# Patient Record
Sex: MALE | Race: WHITE | NOT HISPANIC OR LATINO | Employment: FULL TIME | URBAN - METROPOLITAN AREA
[De-identification: names, ages, dates, MRNs, and addresses within clinical notes are randomized per-mention and may not be internally consistent; named-entity substitution may affect disease eponyms.]

---

## 2017-02-03 ENCOUNTER — OFFICE VISIT (OUTPATIENT)
Dept: CARDIOLOGY | Facility: CLINIC | Age: 62
End: 2017-02-03

## 2017-02-03 VITALS
WEIGHT: 181 LBS | SYSTOLIC BLOOD PRESSURE: 142 MMHG | HEART RATE: 77 BPM | BODY MASS INDEX: 28.35 KG/M2 | DIASTOLIC BLOOD PRESSURE: 87 MMHG

## 2017-02-03 DIAGNOSIS — I25.10 CORONARY ARTERY DISEASE INVOLVING NATIVE CORONARY ARTERY OF NATIVE HEART WITHOUT ANGINA PECTORIS: ICD-10-CM

## 2017-02-03 DIAGNOSIS — E78.2 MIXED HYPERLIPIDEMIA: ICD-10-CM

## 2017-02-03 DIAGNOSIS — I10 ESSENTIAL HYPERTENSION: Primary | ICD-10-CM

## 2017-02-03 PROCEDURE — 93000 ELECTROCARDIOGRAM COMPLETE: CPT | Performed by: INTERNAL MEDICINE

## 2017-02-03 PROCEDURE — 99214 OFFICE O/P EST MOD 30 MIN: CPT | Performed by: INTERNAL MEDICINE

## 2017-02-03 NOTE — PROGRESS NOTES
Procedure   [facility]  Cardiology Progress Note    Patient Identification:  Name:Surinder Salmeron  Age:61 y.o.  Sex: male  :  1955  MRN: [id number]           2/3/2017    Subjective:    Chief Complaint   Patient presents with   • Hypertension       HPI     Mr. Salmeron has history of for lung cancer that was resected in the past  following which it was treated with radiation therapy. Following radiation he had a bout of for pericarditis that subsequently resolved. He did have past echocardiogram done that showed normal LV systolic function. He is physically stable denies any exertional shortness of breath, PND or orthopnea. No chest pains, heaviness or pressure. No PND or ortho apnea or heart palpitations. He gets a sharp or stabbing kind of pain in the chest that is more atypical than typical for angina.     He did have a vascular screening done in the past that showed some mild plaque in the carotid arteries. He is on lipitor and lowfat diet is also advised. ECG today showed sinus rhythm with early repolarization . He was in the hospital with some chest pain and he had a cardiolyte stress test done that has been normal in early ,. No ischemia on the Ecg. Subsequently in 2015 he underwent cardiac cath and has some disease in diagonal branch ostium, not a optimal site for stenting, hence, medical management is recommended.    The LAD has 40% mid-disease. There is a first diagonal with the origin 50%-70% esion, luminal irregularities of the circumflex and RCA. The RCA is dominant    He said that his mother passed away and he is a bit depressed since then.  No angina. His ECG has been stable    He said that he had one episode of pain in the left arm and to the side of chest, stabbing kind of pain and unlikely angina.    Review of Systems   Constitution: Negative for chills, fever and malaise/fatigue.   HENT: Negative for headaches and hearing loss.    Eyes: Negative for blurred  vision and double vision.   Cardiovascular: Positive for chest pain. Negative for leg swelling (compression socks).   Respiratory: Positive for snoring. Negative for shortness of breath.    Skin: Negative for color change.   Musculoskeletal: Positive for arthritis and joint pain.   Gastrointestinal: Negative for abdominal pain, nausea and vomiting.   Neurological: Positive for numbness (3 weeks ago). Negative for dizziness and light-headedness.   Psychiatric/Behavioral: Negative for depression.       Past Medical History   Diagnosis Date   • Hyperlipidemia    • Lung cancer    • Pericarditis        Past Surgical History   Procedure Laterality Date   • Bronchoscopy     • Vasectomy         Social History     Social History   • Marital status:      Spouse name: N/A   • Number of children: N/A   • Years of education: N/A     Occupational History   • Not on file.     Social History Main Topics   • Smoking status: Former Smoker     Packs/day: 4.00     Years: 30.00     Types: Cigarettes     Quit date: 2005   • Smokeless tobacco: Never Used   • Alcohol use No   • Drug use: No   • Sexual activity: Not on file     Other Topics Concern   • Not on file     Social History Narrative       Family History   Problem Relation Age of Onset   • Coronary artery disease Other    • Diabetes Other    • Lung cancer Other        Scheduled Meds:    Current Outpatient Prescriptions:   •  aspirin 81 MG tablet, Take 1 tablet by mouth daily., Disp: , Rfl:   •  atorvastatin (LIPITOR) 80 MG tablet, TAKE 1 TABLET DAILY, Disp: 90 tablet, Rfl: 0  •  Coenzyme Q10 (CO Q-10) 300 MG capsule, Take 1 capsule by mouth daily., Disp: , Rfl:   •  isosorbide mononitrate (IMDUR) 30 MG 24 hr tablet, TAKE 1 TABLET DAILY AS DIRECTED, Disp: 90 tablet, Rfl: 1  •  metoprolol succinate XL (TOPROL-XL) 50 MG 24 hr tablet, Take 1 tablet by mouth daily. One and 1/2 tab qd, Disp: 140 tablet, Rfl: 3  •  Multiple Vitamins tablet, Take 1 tablet by mouth daily., Disp: ,  Rfl:   •  nitroglycerin (NITROSTAT) 0.4 MG SL tablet, Place under the tongue., Disp: , Rfl:   •  Omega-3 Fatty Acids (FISH OIL) 1000 MG capsule capsule, Take  by mouth daily with breakfast., Disp: , Rfl:     Objective:  Visit Vitals   • /87   • Pulse 77   • Wt 181 lb (82.1 kg)   • BMI 28.35 kg/m2        Physical Exam  Physical Exam:    General: No acute distress.    Skin: Warm and dry, no diaphoresis noted   HEENT: No ptosis; external ear and nose normal; oral mucosa moist   Neck: Supple; no carotid bruits; no JVD, Trachea mid line   Heart: S1S2 regular rate and rhythm; soft systolic murmurs; no gallop or rub appreciated, apex not displaced   Chest: Respirations regular, unlabored at rest, bilateral breath sounds have good air entry; no  wheezes auscultated.     Abdomen: Soft, non-tender, non-distended, positive bowel sounds  No hepatosplenomegaly   Extremities: Bilateral lower extremities have no pre-tibial pitting edema; Radials are palpable   Neurological: Alert and oriented x 3; no new motor deficits,           ECG 12 Lead  Date/Time: 2/3/2017 2:27 PM  Performed by: SPENCER ROSENTHAL  Authorized by: SPENCER ROSENTHAL   Comparison: compared with previous ECG   Rhythm: sinus rhythm  Rate: normal  QRS axis: normal             Comparison to previous ECG:  Similar to previous ecg     Assessment:  Problem List Items Addressed This Visit        Cardiovascular and Mediastinum    Essential hypertension - Primary    Overview     Overview:   2015 IMO UPDATE         Hyperlipidemia    Overview     Overview:   pericarditis           Other Visit Diagnoses     Coronary artery disease involving native coronary artery of native heart without angina pectoris              Plan:    Overall he is clinically stable and his chest pain is more atypical than typical.  I will obtain a plain treadmill stress test and see how he does. His ECG no acute ST abnormality.      02/03/2017  Chance Rosenthal MD, FACC

## 2017-02-15 ENCOUNTER — HOSPITAL ENCOUNTER (OUTPATIENT)
Dept: CARDIOLOGY | Facility: HOSPITAL | Age: 62
Discharge: HOME | End: 2017-02-15
Attending: INTERNAL MEDICINE | Admitting: INTERNAL MEDICINE

## 2017-02-15 VITALS — HEART RATE: 90 BPM | DIASTOLIC BLOOD PRESSURE: 82 MMHG | SYSTOLIC BLOOD PRESSURE: 142 MMHG

## 2017-02-15 DIAGNOSIS — E78.2 MIXED HYPERLIPIDEMIA: ICD-10-CM

## 2017-02-15 DIAGNOSIS — I25.10 CORONARY ARTERY DISEASE INVOLVING NATIVE CORONARY ARTERY OF NATIVE HEART WITHOUT ANGINA PECTORIS: ICD-10-CM

## 2017-02-15 DIAGNOSIS — I10 ESSENTIAL HYPERTENSION: ICD-10-CM

## 2017-02-15 PROCEDURE — 93018 CV STRESS TEST I&R ONLY: CPT | Performed by: INTERNAL MEDICINE

## 2017-02-15 PROCEDURE — 93016 CV STRESS TEST SUPVJ ONLY: CPT | Performed by: INTERNAL MEDICINE

## 2017-02-15 PROCEDURE — 93017 CV STRESS TEST TRACING ONLY: CPT

## 2017-02-20 ENCOUNTER — TELEPHONE (OUTPATIENT)
Dept: CARDIOLOGY | Facility: CLINIC | Age: 62
End: 2017-02-20

## 2017-02-20 NOTE — TELEPHONE ENCOUNTER
Left message for Mr. Salmeron to return my call regarding TMT results.   TRISH Lopez RN      TMT stress test normal per [facility].

## 2017-03-10 DIAGNOSIS — E78.5 HYPERLIPEMIA: ICD-10-CM

## 2017-03-13 RX ORDER — ATORVASTATIN CALCIUM 80 MG/1
TABLET, FILM COATED ORAL
Qty: 90 TABLET | Refills: 1 | Status: SHIPPED | OUTPATIENT
Start: 2017-03-13

## 2017-03-25 RX ORDER — ISOSORBIDE MONONITRATE 30 MG/1
TABLET, EXTENDED RELEASE ORAL
Qty: 90 TABLET | Refills: 0 | Status: SHIPPED | OUTPATIENT
Start: 2017-03-25 | End: 2017-06-23 | Stop reason: SDUPTHER

## 2017-04-09 LAB
BH CV STRESS BP STAGE 1: NORMAL
BH CV STRESS BP STAGE 2: NORMAL
BH CV STRESS BP STAGE 3: NORMAL
BH CV STRESS DURATION MIN STAGE 1: 3
BH CV STRESS DURATION MIN STAGE 2: 3
BH CV STRESS DURATION MIN STAGE 3: 0
BH CV STRESS DURATION SEC STAGE 1: 0
BH CV STRESS DURATION SEC STAGE 2: 0
BH CV STRESS DURATION SEC STAGE 3: 30
BH CV STRESS GRADE STAGE 1: 10
BH CV STRESS GRADE STAGE 2: 12
BH CV STRESS GRADE STAGE 3: 14
BH CV STRESS HR STAGE 1: 124
BH CV STRESS HR STAGE 2: 147
BH CV STRESS HR STAGE 3: 153
BH CV STRESS METS STAGE 1: 4.6
BH CV STRESS METS STAGE 2: 7.1
BH CV STRESS METS STAGE 3: 7.6
BH CV STRESS PROTOCOL 1: NORMAL
BH CV STRESS RECOVERY BP: NORMAL MMHG
BH CV STRESS RECOVERY HR: 99 BPM
BH CV STRESS SPEED STAGE 1: 1.7
BH CV STRESS SPEED STAGE 2: 2.5
BH CV STRESS SPEED STAGE 3: 3.4
BH CV STRESS STAGE 1: 1
BH CV STRESS STAGE 2: 2
BH CV STRESS STAGE 3: 3
MAXIMAL PREDICTED HEART RATE: 161 BPM
PERCENT MAX PREDICTED HR: 95.03 %
STRESS BASELINE BP: NORMAL MMHG
STRESS BASELINE HR: 88 BPM
STRESS PERCENT HR: 112 %
STRESS POST ESTIMATED WORKLOAD: 7.7 METS
STRESS POST EXERCISE DUR MIN: 6 MIN
STRESS POST EXERCISE DUR SEC: 30 SEC
STRESS POST PEAK BP: NORMAL MMHG
STRESS POST PEAK HR: 153 BPM
STRESS TARGET HR: 137 BPM

## 2017-06-23 RX ORDER — ISOSORBIDE MONONITRATE 30 MG/1
TABLET, EXTENDED RELEASE ORAL
Qty: 90 TABLET | Refills: 0 | Status: SHIPPED | OUTPATIENT
Start: 2017-06-23

## 2017-08-04 ENCOUNTER — OFFICE VISIT (OUTPATIENT)
Dept: CARDIOLOGY | Facility: CLINIC | Age: 62
End: 2017-08-04

## 2017-08-04 VITALS
HEART RATE: 69 BPM | WEIGHT: 195 LBS | DIASTOLIC BLOOD PRESSURE: 76 MMHG | BODY MASS INDEX: 30.54 KG/M2 | SYSTOLIC BLOOD PRESSURE: 130 MMHG

## 2017-08-04 DIAGNOSIS — I10 ESSENTIAL HYPERTENSION: Primary | ICD-10-CM

## 2017-08-04 DIAGNOSIS — E78.2 MIXED HYPERLIPIDEMIA: ICD-10-CM

## 2017-08-04 PROCEDURE — 93000 ELECTROCARDIOGRAM COMPLETE: CPT | Performed by: INTERNAL MEDICINE

## 2017-08-04 PROCEDURE — 99214 OFFICE O/P EST MOD 30 MIN: CPT | Performed by: INTERNAL MEDICINE

## 2017-08-04 NOTE — PROGRESS NOTES
Procedure   [facility]  Cardiology Progress Note    Patient Identification:  Name:Surinder Salmeron  Age:62 y.o.  Sex: male  :  1955  MRN: [id number]           2017    Subjective:    Chief Complaint   Patient presents with   • Hyperlipidemia       HPI       Mr. Salmeron has history of for lung cancer that was resected in the past  following which it was treated with radiation therapy. Following radiation he had a bout of for pericarditis that subsequently resolved. He did have past echocardiogram done that showed normal LV systolic function. He is physically stable denies any exertional shortness of breath, PND or orthopnea. No chest pains, heaviness or pressure. No PND or ortho apnea or heart palpitations. He gets a sharp or stabbing kind of pain in the chest that is more atypical than typical for angina.      He did have a vascular screening done in the past that showed some mild plaque in the carotid arteries. He is on lipitor and lowfat diet is also advised. ECG today showed sinus rhythm with early repolarization . He was in the hospital with some chest pain and he had a cardiolyte stress test done that has been normal in early ,. No ischemia on the Ecg. Subsequently in 2015 he underwent cardiac cath and has some disease in diagonal branch ostium, not a optimal site for stenting, hence, medical management is recommended.     The LAD has 40% mid-disease. There is a first diagonal with the origin 50%-70% esion, luminal irregularities of the circumflex and RCA. The RCA is dominant     He said that his mother passed away and he is a bit depressed since then.  No angina. His ECG has been stable     He had normal treadmill stress test in February.      Review of Systems   Constitution: Negative for chills, fever and malaise/fatigue.   Cardiovascular: Negative for chest pain, irregular heartbeat, leg swelling and palpitations.   Respiratory: Positive for snoring. Negative for  shortness of breath.    Skin: Negative for color change.   Musculoskeletal: Positive for arthritis and joint pain.   Neurological: Negative for dizziness, light-headedness and numbness.       The following portions of the patient's history were reviewed and updated as appropriate: allergies, current medications, past family history, past medical history, past social history,and problem list.    Past Medical History:   Diagnosis Date   • H/O: lung cancer    • Hyperlipidemia    • Hypertension    • Lung cancer    • Pericarditis        Past Surgical History:   Procedure Laterality Date   • BRONCHOSCOPY     • CARDIAC CATHETERIZATION     • VASECTOMY         Social History     Social History   • Marital status:      Spouse name: N/A   • Number of children: N/A   • Years of education: N/A     Occupational History   • Not on file.     Social History Main Topics   • Smoking status: Former Smoker     Packs/day: 4.00     Years: 30.00     Types: Cigarettes     Quit date: 2005   • Smokeless tobacco: Never Used   • Alcohol use No   • Drug use: No   • Sexual activity: Not on file     Other Topics Concern   • Not on file     Social History Narrative       Family History   Problem Relation Age of Onset   • Coronary artery disease Other    • Diabetes Other    • Lung cancer Other        Scheduled Meds:    Current Outpatient Prescriptions:   •  aspirin 81 MG tablet, Take 1 tablet by mouth daily., Disp: , Rfl:   •  atorvastatin (LIPITOR) 80 MG tablet, TAKE 1 TABLET DAILY, Disp: 90 tablet, Rfl: 1  •  Coenzyme Q10 (CO Q-10) 300 MG capsule, Take 1 capsule by mouth daily., Disp: , Rfl:   •  isosorbide mononitrate (IMDUR) 30 MG 24 hr tablet, TAKE 1 TABLET DAILY AS DIRECTED, Disp: 90 tablet, Rfl: 0  •  metoprolol succinate XL (TOPROL-XL) 50 MG 24 hr tablet, Take 1 tablet by mouth daily. One and 1/2 tab qd, Disp: 140 tablet, Rfl: 3  •  Multiple Vitamins tablet, Take 1 tablet by mouth daily., Disp: , Rfl:   •  nitroglycerin (NITROSTAT)  0.4 MG SL tablet, Place under the tongue., Disp: , Rfl:   •  Omega-3 Fatty Acids (FISH OIL) 1000 MG capsule capsule, Take  by mouth daily with breakfast., Disp: , Rfl:     Objective:  /76  Pulse 69  Wt 195 lb (88.5 kg)  BMI 30.54 kg/m2     Physical Exam  Physical Exam:    General: No acute distress.    Skin: Warm and dry, no diaphoresis noted   HEENT: No ptosis; external ear and nose normal; oral mucosa moist   Neck: Supple; no carotid bruits; no JVD, Trachea mid line   Heart: S1S2 regular rate and rhythm; no murmurs; no gallop or rub appreciated, apex not displaced   Chest: Respirations regular, unlabored at rest, bilateral breath sounds have good air entry; no  wheezes auscultated.     Abdomen: Soft, non-tender, non-distended, positive bowel sounds  No hepatosplenomegaly   Extremities: Bilateral lower extremities have no pre-tibial pitting edema; Radials are palpable   Neurological: Alert and oriented x 3; no new motor deficits,           ECG 12 Lead  Date/Time: 8/4/2017 4:21 PM  Performed by: SPENCER ALAS  Authorized by: SPENCER ALAS   Rhythm: sinus rhythm  Rate: normal  Comments: Early repolarization           Comparison to previous ECG:  Similar to previous ecg     Assessment:  Problem List Items Addressed This Visit        Cardiovascular and Mediastinum    Essential hypertension - Primary    Overview     Overview:   2015 IMO UPDATE         Hyperlipidemia    Overview     Overview:   pericarditis               Plan:    Overall clinically stable with no angina. Normal stress test. I gave him a vascular screening leaflet. From now I asked him to follow up pmd.              I not only counseled the patient today on the risk factor modification of significant factors noted in the assessment and plan, and I also recommended that the patient reduce salt and saturated animal fat intake in diet, about the advantages of plant based diet, as well as to perform scheduled exercise on a regular  basis.    08/04/2017  Chance Rosenthal MD, FACC

## 2021-03-16 ENCOUNTER — BULK ORDERING (OUTPATIENT)
Dept: CASE MANAGEMENT | Facility: OTHER | Age: 66
End: 2021-03-16

## 2021-03-16 DIAGNOSIS — Z23 IMMUNIZATION DUE: ICD-10-CM
